# Patient Record
Sex: FEMALE | Race: WHITE | NOT HISPANIC OR LATINO | URBAN - METROPOLITAN AREA
[De-identification: names, ages, dates, MRNs, and addresses within clinical notes are randomized per-mention and may not be internally consistent; named-entity substitution may affect disease eponyms.]

---

## 2019-07-13 ENCOUNTER — EMERGENCY (EMERGENCY)
Facility: HOSPITAL | Age: 69
LOS: 0 days | Discharge: HOME | End: 2019-07-13
Attending: EMERGENCY MEDICINE | Admitting: EMERGENCY MEDICINE
Payer: COMMERCIAL

## 2019-07-13 VITALS
HEIGHT: 62 IN | OXYGEN SATURATION: 98 % | TEMPERATURE: 97 F | DIASTOLIC BLOOD PRESSURE: 90 MMHG | WEIGHT: 121.92 LBS | HEART RATE: 80 BPM | SYSTOLIC BLOOD PRESSURE: 142 MMHG | RESPIRATION RATE: 18 BRPM
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
## 2019-07-13 DIAGNOSIS — M79.662 PAIN IN LEFT LOWER LEG: ICD-10-CM
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
## 2019-07-13 DIAGNOSIS — S80.12XA CONTUSION OF LEFT LOWER LEG, INITIAL ENCOUNTER: ICD-10-CM
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
## 2019-07-13 PROCEDURE — 99284 EMERGENCY DEPT VISIT MOD MDM: CPT

## 2019-07-13 PROCEDURE — 73590 X-RAY EXAM OF LOWER LEG: CPT | Mod: 26,LT

## 2019-07-13 PROCEDURE — 93970 EXTREMITY STUDY: CPT | Mod: 26

## 2019-07-13 NOTE — ED PROVIDER NOTE - CARE PLAN
Principal Discharge DX:	Contusion of lower leg, left  Secondary Diagnosis:	Leg injury, left, initial encounter

## 2019-07-13 NOTE — ED PROVIDER NOTE - CARE PROVIDER_API CALL
Parker Arrington)  Orthopaedic Surgery  [address] [place] [address]  Phone: [phone]  Fax: [phone]  Follow Up Time:

## 2019-07-13 NOTE — ED PROVIDER NOTE - OBJECTIVE STATEMENT
68 y/o female presents s/p mechanical fall today. patient with large hematoma with abrasion to left lower leg. patient able to ambulate without difficulty. patient denies any tingling or deformity . no active bleeding. no other injuries. patient c/o throbbing pain. patient took asa prior to arrival for pain . symptoms improved with elevation and ice

## 2019-07-13 NOTE — ED PROVIDER NOTE - SKIN, MLM
Skin normal color for race, warm, dry and +large hematoma to left lateral lower leg , no crepitation, +abrasion without any bleeding

## 2019-07-13 NOTE — ED PROVIDER NOTE - CARE PROVIDERS DIRECT ADDRESSES
[email or web address]

## 2019-07-13 NOTE — ED PROVIDER NOTE - CLINICAL SUMMARY MEDICAL DECISION MAKING FREE TEXT BOX
hematoma as above, compartments soft NVI distally imaging reviewed, supportive care, pmd f/u. Patient counseled regarding conditions which should prompt return.

## 2019-07-13 NOTE — ED PROVIDER NOTE - MUSCULOSKELETAL, MLM
Spine appears normal, range of motion is not limited, no muscle or joint tenderness to left lower leg, gait steady

## 2019-07-13 NOTE — ED ADULT NURSE NOTE - NSIMPLEMENTINTERV_GEN_ALL_ED
Implemented All Universal Safety Interventions:  [place] to call system. Call bell, personal items and telephone within reach. Instruct patient to call for assistance. Room bathroom lighting operational. Non-slip footwear when patient is off stretcher. Physically safe environment: no spills, clutter or unnecessary equipment. Stretcher in lowest position, wheels locked, appropriate side rails in place.

## 2020-10-29 NOTE — ASU PATIENT PROFILE, ADULT - ARRIVAL TIME
Spoke with Milena to see how she is doing. She is having a lot of problems with her knees. Dr Dhaliwal would like to get her off of the Prednisone. Milena said she is taking 5mg every other day and has been doing this since 9/10/2020. Scheduled follow up appt mid November.    12:00

## 2020-10-30 ENCOUNTER — RESULT REVIEW (OUTPATIENT)
Age: 70
End: 2020-10-30

## 2020-10-30 ENCOUNTER — OUTPATIENT (OUTPATIENT)
Dept: OUTPATIENT SERVICES | Facility: HOSPITAL | Age: 70
LOS: 1 days | Discharge: HOME | End: 2020-10-30
Payer: COMMERCIAL

## 2020-10-30 VITALS — DIASTOLIC BLOOD PRESSURE: 65 MMHG | SYSTOLIC BLOOD PRESSURE: 142 MMHG | RESPIRATION RATE: 16 BRPM | HEART RATE: 62 BPM

## 2020-10-30 VITALS
WEIGHT: 121.92 LBS | RESPIRATION RATE: 17 BRPM | SYSTOLIC BLOOD PRESSURE: 146 MMHG | DIASTOLIC BLOOD PRESSURE: 76 MMHG | HEART RATE: 63 BPM | TEMPERATURE: 99 F | OXYGEN SATURATION: 98 % | HEIGHT: 67 IN

## 2020-10-30 DIAGNOSIS — Z90.49 ACQUIRED ABSENCE OF OTHER SPECIFIED PARTS OF DIGESTIVE TRACT: Chronic | ICD-10-CM

## 2020-10-30 DIAGNOSIS — Z90.89 ACQUIRED ABSENCE OF OTHER ORGANS: Chronic | ICD-10-CM

## 2020-10-30 PROCEDURE — 88305 TISSUE EXAM BY PATHOLOGIST: CPT | Mod: 26

## 2020-10-30 RX ORDER — MORPHINE SULFATE 50 MG/1
4 CAPSULE, EXTENDED RELEASE ORAL
Refills: 0 | Status: DISCONTINUED | OUTPATIENT
Start: 2020-10-30 | End: 2020-10-30

## 2020-10-30 RX ORDER — MORPHINE SULFATE 50 MG/1
2 CAPSULE, EXTENDED RELEASE ORAL
Refills: 0 | Status: DISCONTINUED | OUTPATIENT
Start: 2020-10-30 | End: 2020-10-30

## 2020-10-30 RX ORDER — OXYCODONE AND ACETAMINOPHEN 5; 325 MG/1; MG/1
1 TABLET ORAL ONCE
Refills: 0 | Status: DISCONTINUED | OUTPATIENT
Start: 2020-10-30 | End: 2020-10-30

## 2020-10-30 RX ORDER — SODIUM CHLORIDE 9 MG/ML
1000 INJECTION, SOLUTION INTRAVENOUS
Refills: 0 | Status: DISCONTINUED | OUTPATIENT
Start: 2020-10-30 | End: 2020-11-13

## 2020-10-30 RX ORDER — ONDANSETRON 8 MG/1
4 TABLET, FILM COATED ORAL ONCE
Refills: 0 | Status: DISCONTINUED | OUTPATIENT
Start: 2020-10-30 | End: 2020-11-13

## 2020-10-30 RX ORDER — MEPERIDINE HYDROCHLORIDE 50 MG/ML
12.5 INJECTION INTRAMUSCULAR; INTRAVENOUS; SUBCUTANEOUS ONCE
Refills: 0 | Status: DISCONTINUED | OUTPATIENT
Start: 2020-10-30 | End: 2020-10-30

## 2020-10-30 RX ADMIN — SODIUM CHLORIDE 100 MILLILITER(S): 9 INJECTION, SOLUTION INTRAVENOUS at 17:29

## 2020-10-30 NOTE — BRIEF OPERATIVE NOTE - NSICDXBRIEFPOSTOP_GEN_ALL_CORE_FT
POST-OP DIAGNOSIS:  Uterine fibroid 30-Oct-2020 17:11:51  Amy Owusu  Endometrial polyp 30-Oct-2020 17:11:37  Amy Owusu

## 2020-10-30 NOTE — BRIEF OPERATIVE NOTE - NSICDXBRIEFPROCEDURE_GEN_ALL_CORE_FT
PROCEDURES:  Hysteroscopy, with dilation and curettage of uterus and polypectomy or uterine myomectomy using MyoSure tissue removal system 30-Oct-2020 17:11:28  Amy Owusu

## 2020-10-30 NOTE — ASU DISCHARGE PLAN (ADULT/PEDIATRIC) - CARE PROVIDER_API CALL
Renate To  OBSTETRICS AND GYNECOLOGY  [address] [place] [address]  Phone: [phone]  Fax: [phone]  Follow Up Time: 2 weeks

## 2020-10-30 NOTE — PACU DISCHARGE NOTE - COMMENTS
70 y o female S/P Hysteroscopy, Dilatation and Curettage, Myosure, GA/LMA without complications. VS /70 HR 75 RR 16 T 98.3 SaO2 99%. Pt tolerated procedure well.

## 2020-10-30 NOTE — BRIEF OPERATIVE NOTE - OPERATION/FINDINGS
EUA: 6w sized retroverted uterus. On hysteroscopy, 2cm endometrial polyp and 4cm submucous fibroid noted, removed using Myosure.

## 2020-11-04 DIAGNOSIS — D25.0 SUBMUCOUS LEIOMYOMA OF UTERUS: ICD-10-CM

## 2020-11-04 DIAGNOSIS — N84.0 POLYP OF CORPUS UTERI: ICD-10-CM

## 2020-11-04 LAB — SURGICAL PATHOLOGY STUDY: SIGNIFICANT CHANGE UP

## 2021-05-13 PROBLEM — Z00.00 ENCOUNTER FOR PREVENTIVE HEALTH EXAMINATION: Status: ACTIVE | Noted: 2021-05-13

## 2022-04-07 PROBLEM — A31.0 PULMONARY MYCOBACTERIAL INFECTION: Chronic | Status: ACTIVE | Noted: 2020-10-30

## 2022-07-15 ENCOUNTER — APPOINTMENT (OUTPATIENT)
Age: 72
End: 2022-07-15

## 2022-07-15 VITALS
DIASTOLIC BLOOD PRESSURE: 70 MMHG | OXYGEN SATURATION: 95 % | WEIGHT: 124 LBS | SYSTOLIC BLOOD PRESSURE: 116 MMHG | HEART RATE: 61 BPM | HEIGHT: 60 IN | BODY MASS INDEX: 24.35 KG/M2 | RESPIRATION RATE: 14 BRPM

## 2022-07-15 PROCEDURE — 99214 OFFICE O/P EST MOD 30 MIN: CPT

## 2022-07-15 NOTE — ASSESSMENT
[FreeTextEntry1] : Bronchiectasis with mucous plugging.  Likely MAC \par New nodular densities likely infectious / inflammatory \par RN used to see MAC

## 2022-07-15 NOTE — HISTORY OF PRESENT ILLNESS
[Follow-Up - Routine Clinic] : a routine clinic follow-up of [Productive Cough] : productive cough [Currently Experiencing] : The patient is currently experiencing symptoms. [None] : No associated symptoms are reported

## 2022-08-07 NOTE — ASU DISCHARGE PLAN (ADULT/PEDIATRIC) - PROCEDURE
Hysteroscopy, dilation and curettage, polypectomy and myomectomy using Myosure
[facility]

## 2022-10-10 ENCOUNTER — APPOINTMENT (OUTPATIENT)
Age: 72
End: 2022-10-10

## 2022-10-10 PROCEDURE — 99442: CPT

## 2022-10-10 NOTE — HISTORY OF PRESENT ILLNESS
[Home] : at home, [unfilled] , at the time of the visit. [Medical Office: ([facility])___] : at the medical office located in  [Verbal consent obtained from patient] : the patient, [unfilled] [Follow-Up - Routine Clinic] : a routine clinic follow-up of [Currently Experiencing] : The patient is currently experiencing symptoms. [Productive Cough] : productive cough [None] : No associated symptoms are reported

## 2023-05-18 ENCOUNTER — APPOINTMENT (OUTPATIENT)
Dept: PULMONOLOGY | Facility: CLINIC | Age: 73
End: 2023-05-18
Payer: COMMERCIAL

## 2023-05-18 VITALS
WEIGHT: 122 LBS | DIASTOLIC BLOOD PRESSURE: 80 MMHG | RESPIRATION RATE: 14 BRPM | OXYGEN SATURATION: 98 % | SYSTOLIC BLOOD PRESSURE: 120 MMHG | BODY MASS INDEX: 22.45 KG/M2 | HEART RATE: 88 BPM | HEIGHT: 62 IN

## 2023-05-18 PROCEDURE — 99214 OFFICE O/P EST MOD 30 MIN: CPT

## 2023-05-18 NOTE — ASSESSMENT
[FreeTextEntry1] : Bronchiectasis with mucous plugging.  Likely recurrent  MAC VS new infection \par SP Therapy for MAC in 2013 for one year by Dr. Martins  \par New nodular densities likely infectious / inflammatory \par RN used to see MAC

## 2023-10-18 ENCOUNTER — APPOINTMENT (OUTPATIENT)
Dept: PULMONOLOGY | Facility: CLINIC | Age: 73
End: 2023-10-18
Payer: COMMERCIAL

## 2023-10-18 VITALS
WEIGHT: 120 LBS | HEART RATE: 62 BPM | DIASTOLIC BLOOD PRESSURE: 82 MMHG | BODY MASS INDEX: 22.08 KG/M2 | HEIGHT: 62 IN | OXYGEN SATURATION: 95 % | SYSTOLIC BLOOD PRESSURE: 129 MMHG

## 2023-10-18 DIAGNOSIS — J47.9 BRONCHIECTASIS, UNCOMPLICATED: ICD-10-CM

## 2023-10-18 PROCEDURE — 99214 OFFICE O/P EST MOD 30 MIN: CPT

## 2024-12-09 ENCOUNTER — APPOINTMENT (OUTPATIENT)
Dept: PULMONOLOGY | Facility: CLINIC | Age: 74
End: 2024-12-09

## 2025-01-25 NOTE — ASSESSMENT
[FreeTextEntry1] : Bronchiectasis with mucous plugging.  Likely MAC \par New nodular densities likely infectious / inflammatory \par RN used to see MAC 
Improved

## 2025-05-05 ENCOUNTER — APPOINTMENT (OUTPATIENT)
Dept: PULMONOLOGY | Facility: CLINIC | Age: 75
End: 2025-05-05